# Patient Record
Sex: MALE
[De-identification: names, ages, dates, MRNs, and addresses within clinical notes are randomized per-mention and may not be internally consistent; named-entity substitution may affect disease eponyms.]

---

## 2019-10-21 ENCOUNTER — HOSPITAL ENCOUNTER (EMERGENCY)
Dept: HOSPITAL 5 - ED | Age: 44
Discharge: HOME | End: 2019-10-21
Payer: SELF-PAY

## 2019-10-21 VITALS — DIASTOLIC BLOOD PRESSURE: 68 MMHG | SYSTOLIC BLOOD PRESSURE: 159 MMHG

## 2019-10-21 DIAGNOSIS — R07.89: ICD-10-CM

## 2019-10-21 DIAGNOSIS — R51: Primary | ICD-10-CM

## 2019-10-21 DIAGNOSIS — Z98.84: ICD-10-CM

## 2019-10-21 DIAGNOSIS — Z86.718: ICD-10-CM

## 2019-10-21 LAB
BASOPHILS # (AUTO): 0 K/MM3 (ref 0–0.1)
BASOPHILS NFR BLD AUTO: 0.6 % (ref 0–1.8)
BUN SERPL-MCNC: 14 MG/DL (ref 9–20)
BUN/CREAT SERPL: 20 %
CALCIUM SERPL-MCNC: 8.7 MG/DL (ref 8.4–10.2)
EOSINOPHIL # BLD AUTO: 0 K/MM3 (ref 0–0.4)
EOSINOPHIL NFR BLD AUTO: 0.7 % (ref 0–4.3)
HCT VFR BLD CALC: 26.5 % (ref 35.5–45.6)
HEMOLYSIS INDEX: 3
HGB BLD-MCNC: 7.8 GM/DL (ref 11.8–15.2)
LYMPHOCYTES # BLD AUTO: 0.2 K/MM3 (ref 1.2–5.4)
LYMPHOCYTES NFR BLD AUTO: 4.9 % (ref 13.4–35)
MCHC RBC AUTO-ENTMCNC: 29 % (ref 32–34)
MCV RBC AUTO: 71 FL (ref 84–94)
MONOCYTES # (AUTO): 0.8 K/MM3 (ref 0–0.8)
MONOCYTES % (AUTO): 15.9 % (ref 0–7.3)
PLATELET # BLD: 197 K/MM3 (ref 140–440)
RBC # BLD AUTO: 3.76 M/MM3 (ref 3.65–5.03)

## 2019-10-21 PROCEDURE — 71275 CT ANGIOGRAPHY CHEST: CPT

## 2019-10-21 PROCEDURE — 85025 COMPLETE CBC W/AUTO DIFF WBC: CPT

## 2019-10-21 PROCEDURE — 93005 ELECTROCARDIOGRAM TRACING: CPT

## 2019-10-21 PROCEDURE — 96375 TX/PRO/DX INJ NEW DRUG ADDON: CPT

## 2019-10-21 PROCEDURE — 70450 CT HEAD/BRAIN W/O DYE: CPT

## 2019-10-21 PROCEDURE — 99285 EMERGENCY DEPT VISIT HI MDM: CPT

## 2019-10-21 PROCEDURE — 96374 THER/PROPH/DIAG INJ IV PUSH: CPT

## 2019-10-21 PROCEDURE — 93010 ELECTROCARDIOGRAM REPORT: CPT

## 2019-10-21 PROCEDURE — 71045 X-RAY EXAM CHEST 1 VIEW: CPT

## 2019-10-21 PROCEDURE — 80048 BASIC METABOLIC PNL TOTAL CA: CPT

## 2019-10-21 PROCEDURE — 84484 ASSAY OF TROPONIN QUANT: CPT

## 2019-10-21 PROCEDURE — 36415 COLL VENOUS BLD VENIPUNCTURE: CPT

## 2019-10-21 NOTE — CAT SCAN REPORT
CTA CHEST WITH IV CONTRAST



INDICATION: Acute onset chest pain with dyspnea.



TECHNIQUE:

Axial CT images were obtained through the chest after injection of 100 mL IV contrast. 3 plane MIP re
constructions were produced. All CT scans at this location are performed using CT dose reduction for 
ALARA by means of automated exposure control. 



COMPARISON:

None available.



FINDINGS:

PULMONARY ARTERIES: No pulmonary emboli.

AORTA AND ARTERIES: No acute abnormality.

MEDIASTINUM: No mass, lymphadenopathy or other significant abnormality. The heart is normal in size w
ithout a pericardial effusion. The trachea and main bronchi are patent and normal in caliber. 

LUNGS: No suspicious consolidation, nodule or mass.  No pneumothorax or pleural effusion.  



ADDITIONAL FINDINGS: None.



UPPER ABDOMEN: No acute findings.



BONES: No significant osseous abnormality.



IMPRESSION:

1. No CT evidence for pulmonary embolism. 

2. No acute findings.



Signer Name: Saw Myers MD 

Signed: 10/21/2019 5:40 AM

 Workstation Name: [id number]

## 2019-10-21 NOTE — CAT SCAN REPORT
CT head without contrast



INDICATION :  Headache with hypertension.



TECHNIQUE:  Axial imaging performed from the skull apex through the skull base without the use of con
trast.  All CT examinations performed at this facility utilize dose modulation, iterative reconstruct
ion or weight-based dosing, when appropriate, to reduce radiation dose to as low as reasonably achiev
able.



COMPARISON:  None



FINDINGS:  No acute intracranial hemorrhage or parenchymal abnormality.   Ventricles are normal in si
ze and appear symmetric.   Soft tissues including the orbits appear normal.   No acute osseous abnorm
ality.   Sinuses and mastoid air cells are clear.





IMPRESSION: No acute abnormality.



Signer Name: Saw Myers MD 

Signed: 10/21/2019 5:40 AM

 Workstation Name: [id number]

## 2019-10-21 NOTE — XRAY REPORT
CHEST 1 VIEW 



INDICATION / CLINICAL INFORMATION:

Chest Pain.



COMPARISON: 

None available.



FINDINGS:



SUPPORT DEVICES: None.



HEART / MEDIASTINUM: No significant abnormality. 



LUNGS / PLEURA: No significant pulmonary or pleural abnormality. No pneumothorax. 



ADDITIONAL FINDINGS: No significant additional findings.



IMPRESSION:

1. No acute findings.



Signer Name: Saw Myers MD 

Signed: 10/21/2019 5:39 AM

 Workstation Name: [id number]